# Patient Record
Sex: MALE | Race: WHITE | NOT HISPANIC OR LATINO | Employment: FULL TIME | URBAN - METROPOLITAN AREA
[De-identification: names, ages, dates, MRNs, and addresses within clinical notes are randomized per-mention and may not be internally consistent; named-entity substitution may affect disease eponyms.]

---

## 2024-02-24 ENCOUNTER — HOSPITAL ENCOUNTER (OUTPATIENT)
Dept: RADIOLOGY | Facility: HOSPITAL | Age: 44
Discharge: HOME | End: 2024-02-24
Payer: COMMERCIAL

## 2024-02-24 DIAGNOSIS — Z02.89 ENCOUNTER FOR FITNESS FOR DUTY EXAMINATION: ICD-10-CM

## 2024-02-24 PROCEDURE — 75571 CT HRT W/O DYE W/CA TEST: CPT

## 2024-03-14 ENCOUNTER — OFFICE VISIT (OUTPATIENT)
Dept: PRIMARY CARE | Facility: CLINIC | Age: 44
End: 2024-03-14
Payer: COMMERCIAL

## 2024-03-14 VITALS
RESPIRATION RATE: 16 BRPM | HEIGHT: 70 IN | DIASTOLIC BLOOD PRESSURE: 70 MMHG | HEART RATE: 88 BPM | BODY MASS INDEX: 32.07 KG/M2 | SYSTOLIC BLOOD PRESSURE: 125 MMHG | WEIGHT: 224 LBS | OXYGEN SATURATION: 97 %

## 2024-03-14 DIAGNOSIS — R93.1 AGATSTON CAC SCORE, >400: ICD-10-CM

## 2024-03-14 DIAGNOSIS — Z13.1 SCREENING FOR DIABETES MELLITUS: ICD-10-CM

## 2024-03-14 DIAGNOSIS — Z76.89 ENCOUNTER TO ESTABLISH CARE: Primary | ICD-10-CM

## 2024-03-14 DIAGNOSIS — Z13.220 LIPID SCREENING: ICD-10-CM

## 2024-03-14 PROCEDURE — 99204 OFFICE O/P NEW MOD 45 MIN: CPT | Performed by: STUDENT IN AN ORGANIZED HEALTH CARE EDUCATION/TRAINING PROGRAM

## 2024-03-14 NOTE — PROGRESS NOTES
"Subjective   Patient ID: Umesh Mcclain \"Mackenzie" is a 43 y.o. male who presents for No chief complaint on file..  HPI  He is here to establish care with a new provider.  Currently does not have any symptoms but his recent cardiac calcium score was high and was referred to a PCP.  For years he has been experiencing typical chest pain which last for about 2 minutes it is producing with pressing over, is not related to exertion, no shortness of breath during that time, the pain is 2 out of 10 and is sharp but no radiation.  No change with deep breath eating did not change over the years.    Denies new onset headaches, fever, chills, n/v/d, chest pain, SOB, abdominal pain, urinary symptoms, and lower extremity edema.     Review of Systems  All other systems have been reviewed and are negative.    Visit Vitals  /70   Pulse 88   Resp 16   Ht 1.767 m (5' 9.57\")   Wt 102 kg (224 lb)   SpO2 97%   BMI 32.54 kg/m²   BSA 2.24 m²       Objective   Physical Exam  General: Alert and oriented. Appears well-nourished and in no acute distress.  Eyes: PERRLA. EOMI.  Head/neck: Normocephalic. Supple.  Lymphatics: No cervical lymphadenopathy.  Respiratory/Thorax: Clear to auscultation bilaterally. No wheezing.   Cardiovascular: Regular rate and rhythm. No murmurs.  Gastrointestinal: Soft, nontender, nondistended. +BS   Musculoskeletal: ROM intact. No joint swelling. Normal strength   Extremities: Warm and well perfused. No peripheral edema.  Neurological: No gross neurologic deficits.   Psychological: Appropriate mood and affect.   Skin: No visible rashes or lesions.     Assessment/Plan   He is a 43 years old man with no significant past medical history who came to establish care with a new provider because he got elevated calcium score test.  Patient does not have any symptoms, no cardiac risks.    # Establish care  -Order CBC, CMP, TSH  -He is trying to eat healthy and exercise  -He is a  which is very active " as her job.    # Need for screening of diabetes  -Order A1c    # Need for lipid screening  -Ordered lipid panel    # Abnormal cardiac calcium score( 469)  -Cardiology referral  -Normal blood pressure  -Does not smoke        No red flags. Follow up in 1 year for health maintenance or sooner if needed.    Problem List Items Addressed This Visit    None  Visit Diagnoses       Encounter to establish care    -  Primary    Relevant Orders    CBC    Comprehensive Metabolic Panel    TSH with reflex to Free T4 if abnormal    Lipid screening        Relevant Orders    Lipid Panel    Screening for diabetes mellitus        Relevant Orders    Hemoglobin A1C    Agatston CAC score, >400        Relevant Orders    Referral to Cardiology            I have personally reviewed all available pertinent labs, imaging, and consult notes with the patient.     All questions and concerns were addressed. Patient verbalizes understanding instructions and agrees with established plan of care.     I discussed the plan with Dr.Zen Therese Mejía MD  Family medicine resident  PGY2

## 2024-03-19 ENCOUNTER — LAB (OUTPATIENT)
Dept: LAB | Facility: LAB | Age: 44
End: 2024-03-19
Payer: COMMERCIAL

## 2024-03-19 DIAGNOSIS — Z76.89 ENCOUNTER TO ESTABLISH CARE: ICD-10-CM

## 2024-03-19 DIAGNOSIS — Z13.220 LIPID SCREENING: ICD-10-CM

## 2024-03-19 DIAGNOSIS — Z13.1 SCREENING FOR DIABETES MELLITUS: ICD-10-CM

## 2024-03-19 LAB
ALBUMIN SERPL BCP-MCNC: 4.5 G/DL (ref 3.4–5)
ALP SERPL-CCNC: 57 U/L (ref 33–120)
ALT SERPL W P-5'-P-CCNC: 29 U/L (ref 10–52)
ANION GAP SERPL CALC-SCNC: 12 MMOL/L (ref 10–20)
AST SERPL W P-5'-P-CCNC: 21 U/L (ref 9–39)
BILIRUB SERPL-MCNC: 0.3 MG/DL (ref 0–1.2)
BUN SERPL-MCNC: 10 MG/DL (ref 6–23)
CALCIUM SERPL-MCNC: 8.9 MG/DL (ref 8.6–10.3)
CHLORIDE SERPL-SCNC: 106 MMOL/L (ref 98–107)
CHOLEST SERPL-MCNC: 194 MG/DL (ref 0–199)
CHOLESTEROL/HDL RATIO: 4.7
CO2 SERPL-SCNC: 25 MMOL/L (ref 21–32)
CREAT SERPL-MCNC: 0.95 MG/DL (ref 0.5–1.3)
EGFRCR SERPLBLD CKD-EPI 2021: >90 ML/MIN/1.73M*2
ERYTHROCYTE [DISTWIDTH] IN BLOOD BY AUTOMATED COUNT: 12.5 % (ref 11.5–14.5)
EST. AVERAGE GLUCOSE BLD GHB EST-MCNC: 114 MG/DL
GLUCOSE SERPL-MCNC: 112 MG/DL (ref 74–99)
HBA1C MFR BLD: 5.6 %
HCT VFR BLD AUTO: 45 % (ref 41–52)
HDLC SERPL-MCNC: 41.4 MG/DL
HGB BLD-MCNC: 14.7 G/DL (ref 13.5–17.5)
LDLC SERPL CALC-MCNC: 136 MG/DL
MCH RBC QN AUTO: 29.9 PG (ref 26–34)
MCHC RBC AUTO-ENTMCNC: 32.7 G/DL (ref 32–36)
MCV RBC AUTO: 92 FL (ref 80–100)
NON HDL CHOLESTEROL: 153 MG/DL (ref 0–149)
NRBC BLD-RTO: 0 /100 WBCS (ref 0–0)
PLATELET # BLD AUTO: 201 X10*3/UL (ref 150–450)
POTASSIUM SERPL-SCNC: 4.2 MMOL/L (ref 3.5–5.3)
PROT SERPL-MCNC: 6.8 G/DL (ref 6.4–8.2)
RBC # BLD AUTO: 4.92 X10*6/UL (ref 4.5–5.9)
SODIUM SERPL-SCNC: 139 MMOL/L (ref 136–145)
TRIGL SERPL-MCNC: 81 MG/DL (ref 0–149)
TSH SERPL-ACNC: 2.09 MIU/L (ref 0.44–3.98)
VLDL: 16 MG/DL (ref 0–40)
WBC # BLD AUTO: 4.4 X10*3/UL (ref 4.4–11.3)

## 2024-03-19 PROCEDURE — 84443 ASSAY THYROID STIM HORMONE: CPT

## 2024-03-19 PROCEDURE — 80053 COMPREHEN METABOLIC PANEL: CPT

## 2024-03-19 PROCEDURE — 85027 COMPLETE CBC AUTOMATED: CPT

## 2024-03-19 PROCEDURE — 36415 COLL VENOUS BLD VENIPUNCTURE: CPT

## 2024-03-19 PROCEDURE — 80061 LIPID PANEL: CPT

## 2024-03-19 PROCEDURE — 83036 HEMOGLOBIN GLYCOSYLATED A1C: CPT

## 2024-03-21 ENCOUNTER — TELEMEDICINE (OUTPATIENT)
Dept: PRIMARY CARE | Facility: CLINIC | Age: 44
End: 2024-03-21
Payer: COMMERCIAL

## 2024-03-21 DIAGNOSIS — E78.2 MIXED HYPERLIPIDEMIA: ICD-10-CM

## 2024-03-21 DIAGNOSIS — R93.1 AGATSTON CAC SCORE, >400: Primary | ICD-10-CM

## 2024-03-21 PROCEDURE — 99442 PR PHYS/QHP TELEPHONE EVALUATION 11-20 MIN: CPT | Performed by: STUDENT IN AN ORGANIZED HEALTH CARE EDUCATION/TRAINING PROGRAM

## 2024-03-21 RX ORDER — PRAVASTATIN SODIUM 40 MG/1
40 TABLET ORAL DAILY
Qty: 90 TABLET | Refills: 1 | Status: SHIPPED | OUTPATIENT
Start: 2024-03-21 | End: 2025-03-21

## 2024-03-21 NOTE — PROGRESS NOTES
"Subjective   Patient ID: Umesh Mcclain \"Mackenzie" is a 43 y.o. male who presents for No chief complaint on file..  HPI  Patient is here to follow up on his lab results. Plans on making cardiology appt after telehealth visit today.    Denies new onset headaches, fever, chills, n/v/d, chest pain, SOB, abdominal pain, urinary symptoms, and lower extremity edema.     Review of Systems  All other systems have been reviewed and are negative.    No vitals obtained due to nature of telehealth visit.    Objective   Physical Exam  General: No acute distress  Respiratory/Thorax: No increased work of breathing  Psychological: Appropriate mood and affect.      Assessment/Plan     Hyperlipidemia:   Given Agatston > 400, will start patient on Pravastatin 40mg every day   Rx sent to pharmacy. Discussed possible side effects, such as myalgias and elevation in liver enzymes, with patient.  Recommend reducing intake of saturated fat and trans fat, such as red meat and dairy products made with whole milk. Recommend limiting fried foods and cooking with healthy oils, such as vegetable oil. Choose skim milk, low-fat or fat-free dairy products instead. Emphasized increasing fruits, vegetables, whole grains, poultry, fish, nuts and nontropical vegetable oils in diet.  Recheck CMP in one month and lipid panel in three months.     No red flags. Follow up in 3 months after he has seen cardiology.    Problem List Items Addressed This Visit    None  Visit Diagnoses       Agatston CAC score, >400    -  Primary    Relevant Medications    pravastatin (Pravachol) 40 mg tablet    Other Relevant Orders    Comprehensive Metabolic Panel    Mixed hyperlipidemia        Relevant Medications    pravastatin (Pravachol) 40 mg tablet    Other Relevant Orders    Comprehensive Metabolic Panel            I have personally reviewed all available pertinent labs, imaging, and consult notes with the patient.     All questions and concerns were addressed. " Patient verbalizes understanding instructions and agrees with established plan of care.     Nesha Deleon MD, MS  Family Medicine  [facility]    This visit was completed via telephone only due to the restrictions of the COVID-19 pandemic. All issues as above were discussed and addressed but no/some physical exam was performed. If it was felt that the patient should be evaluated in clinic, then they were directed there. The patient verbally consented to visit. All questions and concerns were addressed. Patient verbalizes understanding and has no other questions at this time.   Time spent with patient was 15 minutes, with 50% of the time spent in counseling and coordination of care.

## 2024-04-16 ENCOUNTER — OFFICE VISIT (OUTPATIENT)
Dept: CARDIOLOGY | Facility: HOSPITAL | Age: 44
End: 2024-04-16
Payer: COMMERCIAL

## 2024-04-16 VITALS
HEART RATE: 78 BPM | BODY MASS INDEX: 33.28 KG/M2 | OXYGEN SATURATION: 100 % | DIASTOLIC BLOOD PRESSURE: 93 MMHG | SYSTOLIC BLOOD PRESSURE: 144 MMHG | WEIGHT: 229.06 LBS

## 2024-04-16 DIAGNOSIS — R93.1 AGATSTON CAC SCORE, >400: ICD-10-CM

## 2024-04-16 PROCEDURE — 93010 ELECTROCARDIOGRAM REPORT: CPT | Performed by: INTERNAL MEDICINE

## 2024-04-16 PROCEDURE — 93005 ELECTROCARDIOGRAM TRACING: CPT | Performed by: NURSE PRACTITIONER

## 2024-04-16 PROCEDURE — 99214 OFFICE O/P EST MOD 30 MIN: CPT | Performed by: NURSE PRACTITIONER

## 2024-04-16 PROCEDURE — 99204 OFFICE O/P NEW MOD 45 MIN: CPT | Performed by: NURSE PRACTITIONER

## 2024-04-16 RX ORDER — BISMUTH SUBSALICYLATE 262 MG
1 TABLET,CHEWABLE ORAL DAILY
COMMUNITY

## 2024-04-16 RX ORDER — ASPIRIN 81 MG/1
81 TABLET ORAL DAILY
Start: 2024-04-16 | End: 2025-04-16

## 2024-04-16 NOTE — PROGRESS NOTES
Referred by Dr. Deleon for elevated CCS      History Of Present Illness:    Keven Mcclain is a 43 y.o. male  from home with no significant medical history presenting today to the [facility] for evaluation of recently identified elevated coronary calcium score-- total 469.49 2/24/2024.  Has occasional chest pain that is unrelated to exertion and reproducible on exam, seemingly musculoskeletal in nature.  Does report mild dyspnea with exertion, however, suspects this is related to mild deconditioning.  Started on pravastatin 3 weeks ago by PCP after CCS elevated and tolerating well.  Does not drink alcohol.       Past Medical History:  He has no past medical history on file.    Past Surgical History:  He has no past surgical history on file.      Social History:  He reports that he has never smoked. His smokeless tobacco use includes snuff. No history on file for alcohol use and drug use.    Family History:  No family history on file.     Allergies:  Patient has no known allergies.    Outpatient Medications:  Current Outpatient Medications   Medication Instructions    aspirin 81 mg, oral, Daily    multivitamin tablet 1 tablet, oral, Daily    pravastatin (PRAVACHOL) 40 mg, oral, Daily        Last Recorded Vitals:  Vitals:    04/16/24 0841   BP: (!) 144/93   Pulse: 78   SpO2: 100%   Weight: 104 kg (229 lb 0.9 oz)       Physical Exam:  Constitutional: Pleasant, Awake/Alert/Oriented to person place and time. No distress  Head: Atraumatic, Normocephalic  Eyes: EOMI. ERIC  Neck: No JVD  Cardiovascular: Regular rate and rhythm, S1, S2. No extra heart sounds or murmurs  Respiratory: Clear to auscultation bilaterally. No wheezing, rales or rhonchi. Good chest wall expansion  Abdomen: Soft, Nontender. Bowel sounds appreciated  Musculoskeletal: ROM intact. Muscle strength grossly intact upper and lower extremities 5/5.   Neurological: CNII-XII intact. Sensation grossly intact  Extremities: Warm  and dry. No acute rashes and lesions  Psychiatric: Appropriate mood and affect         Last Labs:  CBC -  Lab Results   Component Value Date    WBC 4.4 03/19/2024    HGB 14.7 03/19/2024    HCT 45.0 03/19/2024    MCV 92 03/19/2024     03/19/2024       CMP -  Lab Results   Component Value Date    CALCIUM 8.9 03/19/2024    PROT 6.8 03/19/2024    ALBUMIN 4.5 03/19/2024    AST 21 03/19/2024    ALT 29 03/19/2024    ALKPHOS 57 03/19/2024    BILITOT 0.3 03/19/2024       LIPID PANEL -   Lab Results   Component Value Date    CHOL 194 03/19/2024    TRIG 81 03/19/2024    HDL 41.4 03/19/2024    CHHDL 4.7 03/19/2024    LDLF 151 (H) 04/25/2018    VLDL 16 03/19/2024    NHDL 153 (H) 03/19/2024       RENAL FUNCTION PANEL -   Lab Results   Component Value Date    GLUCOSE 112 (H) 03/19/2024     03/19/2024    K 4.2 03/19/2024     03/19/2024    CO2 25 03/19/2024    ANIONGAP 12 03/19/2024    BUN 10 03/19/2024    CREATININE 0.95 03/19/2024    CALCIUM 8.9 03/19/2024    ALBUMIN 4.5 03/19/2024        Lab Results   Component Value Date    HGBA1C 5.6 03/19/2024       Last Cardiology Tests:  ECG:  NSR,  HR 85bpm       Cardiac Imaging:  CT cardiac scoring wo IV contrast 02/24/2024    FINDINGS:  The score and distribution of calcium in the coronary arteries is as  follows:      LM 15.59,  .16,  LCx 22.73,  RCA 73.01,      Total 469.49      The visualized mid/lower ascending thoracic aorta measures 3.0 cm in  diameter. The heart is normal in size. No pericardial effusion is  present.      No gross evidence of mediastinal or hilar lymphadenopathy or masses  is identified. The visualized segments of the lungs are normally  expanded.    Lab review: I have personally reviewed the laboratory result(s)   Diagnostic review: I have personally reviewed the result(s) of the EKG and CCS.     Assessment/Plan   Very pleasant 43 y.o. male  from home with no significant medical history presenting today to the [facility] [place] for evaluation of recently identified elevated coronary calcium score-- total 469.49 2/24/2024.    Plan:  -Recommend exercise nuclear stress test for evaluation of stress induced ischemia given CCS >400  -Agree with pravastatin 40mg daily  -Start aspirin 81mg daily  -Follow up pending above testing results.     Lorrie Carrillo, APRN-CNP

## 2024-04-17 LAB
ATRIAL RATE: 85 BPM
P AXIS: 31 DEGREES
P OFFSET: 202 MS
P ONSET: 150 MS
PR INTERVAL: 130 MS
Q ONSET: 215 MS
QRS COUNT: 13 BEATS
QRS DURATION: 94 MS
QT INTERVAL: 364 MS
QTC CALCULATION(BAZETT): 433 MS
QTC FREDERICIA: 408 MS
R AXIS: 14 DEGREES
T AXIS: 21 DEGREES
T OFFSET: 397 MS
VENTRICULAR RATE: 85 BPM

## 2024-04-26 ENCOUNTER — APPOINTMENT (OUTPATIENT)
Dept: RADIOLOGY | Facility: HOSPITAL | Age: 44
End: 2024-04-26
Payer: COMMERCIAL

## 2024-04-30 ENCOUNTER — HOSPITAL ENCOUNTER (OUTPATIENT)
Dept: RADIOLOGY | Facility: HOSPITAL | Age: 44
Discharge: HOME | End: 2024-04-30
Payer: COMMERCIAL

## 2024-04-30 ENCOUNTER — HOSPITAL ENCOUNTER (OUTPATIENT)
Dept: CARDIOLOGY | Facility: HOSPITAL | Age: 44
Discharge: HOME | End: 2024-04-30
Payer: COMMERCIAL

## 2024-04-30 ENCOUNTER — APPOINTMENT (OUTPATIENT)
Dept: CARDIOLOGY | Facility: HOSPITAL | Age: 44
End: 2024-04-30
Payer: COMMERCIAL

## 2024-04-30 DIAGNOSIS — R93.1 AGATSTON CAC SCORE, >400: ICD-10-CM

## 2024-04-30 DIAGNOSIS — R06.09 OTHER FORMS OF DYSPNEA: ICD-10-CM

## 2024-04-30 PROCEDURE — 93017 CV STRESS TEST TRACING ONLY: CPT

## 2024-04-30 PROCEDURE — 78452 HT MUSCLE IMAGE SPECT MULT: CPT | Performed by: RADIOLOGY

## 2024-04-30 PROCEDURE — A9502 TC99M TETROFOSMIN: HCPCS | Performed by: NURSE PRACTITIONER

## 2024-04-30 PROCEDURE — 78452 HT MUSCLE IMAGE SPECT MULT: CPT

## 2024-04-30 PROCEDURE — 3430000001 HC RX 343 DIAGNOSTIC RADIOPHARMACEUTICALS: Performed by: NURSE PRACTITIONER

## 2024-04-30 RX ADMIN — TETROFOSMIN 35.1 MILLICURIE: 0.23 INJECTION, POWDER, LYOPHILIZED, FOR SOLUTION INTRAVENOUS at 11:49

## 2024-04-30 RX ADMIN — TETROFOSMIN 10.5 MILLICURIE: 0.23 INJECTION, POWDER, LYOPHILIZED, FOR SOLUTION INTRAVENOUS at 10:00

## 2025-03-01 DIAGNOSIS — E78.2 MIXED HYPERLIPIDEMIA: ICD-10-CM

## 2025-03-03 RX ORDER — PRAVASTATIN SODIUM 80 MG/1
80 TABLET ORAL DAILY
Qty: 30 TABLET | Refills: 0 | OUTPATIENT
Start: 2025-03-03

## 2025-08-27 DIAGNOSIS — M25.519 SHOULDER PAIN, UNSPECIFIED CHRONICITY, UNSPECIFIED LATERALITY: ICD-10-CM

## 2025-09-02 ENCOUNTER — OFFICE VISIT (OUTPATIENT)
Dept: ORTHOPEDIC SURGERY | Facility: HOSPITAL | Age: 45
End: 2025-09-02
Payer: COMMERCIAL

## 2025-09-02 ENCOUNTER — HOSPITAL ENCOUNTER (OUTPATIENT)
Dept: RADIOLOGY | Facility: HOSPITAL | Age: 45
Discharge: HOME | End: 2025-09-02
Payer: COMMERCIAL

## 2025-09-02 DIAGNOSIS — M25.519 SHOULDER PAIN, UNSPECIFIED CHRONICITY, UNSPECIFIED LATERALITY: ICD-10-CM

## 2025-09-02 DIAGNOSIS — M75.81 RIGHT ROTATOR CUFF TENDINITIS: Primary | ICD-10-CM

## 2025-09-02 PROCEDURE — 73030 X-RAY EXAM OF SHOULDER: CPT | Mod: RT

## 2025-09-02 PROCEDURE — 99204 OFFICE O/P NEW MOD 45 MIN: CPT | Performed by: ORTHOPAEDIC SURGERY

## 2025-09-02 PROCEDURE — 99203 OFFICE O/P NEW LOW 30 MIN: CPT

## 2025-09-02 PROCEDURE — 73030 X-RAY EXAM OF SHOULDER: CPT | Mod: RIGHT SIDE | Performed by: RADIOLOGY

## 2025-09-02 ASSESSMENT — PAIN SCALES - GENERAL: PAINLEVEL_OUTOF10: 10 - WORST POSSIBLE PAIN

## 2025-09-02 ASSESSMENT — PAIN - FUNCTIONAL ASSESSMENT: PAIN_FUNCTIONAL_ASSESSMENT: 0-10
